# Patient Record
Sex: MALE | Race: WHITE | Employment: OTHER | URBAN - METROPOLITAN AREA
[De-identification: names, ages, dates, MRNs, and addresses within clinical notes are randomized per-mention and may not be internally consistent; named-entity substitution may affect disease eponyms.]

---

## 2017-01-02 ENCOUNTER — OFFICE VISIT (OUTPATIENT)
Dept: FAMILY MEDICINE CLINIC | Facility: CLINIC | Age: 82
End: 2017-01-02

## 2017-01-02 DIAGNOSIS — J02.9 PHARYNGITIS, UNSPECIFIED ETIOLOGY: ICD-10-CM

## 2017-01-02 DIAGNOSIS — J02.9 SORE THROAT: Primary | ICD-10-CM

## 2017-01-02 LAB
CONTROL LINE PRESENT WITH A CLEAR BACKGROUND (YES/NO): YES YES/NO
STREP GRP A CUL-SCR: NEGATIVE

## 2017-01-02 PROCEDURE — 87880 STREP A ASSAY W/OPTIC: CPT | Performed by: NURSE PRACTITIONER

## 2017-01-02 PROCEDURE — 87081 CULTURE SCREEN ONLY: CPT | Performed by: NURSE PRACTITIONER

## 2017-01-02 PROCEDURE — 99203 OFFICE O/P NEW LOW 30 MIN: CPT | Performed by: NURSE PRACTITIONER

## 2017-01-03 VITALS
SYSTOLIC BLOOD PRESSURE: 118 MMHG | HEART RATE: 78 BPM | TEMPERATURE: 98 F | DIASTOLIC BLOOD PRESSURE: 68 MMHG | OXYGEN SATURATION: 99 % | RESPIRATION RATE: 18 BRPM

## 2017-01-03 RX ORDER — METOPROLOL TARTRATE 50 MG/1
TABLET, FILM COATED ORAL
Refills: 3 | COMMUNITY
Start: 2016-11-21

## 2017-01-03 RX ORDER — PRAVASTATIN SODIUM 40 MG
TABLET ORAL
Refills: 3 | COMMUNITY
Start: 2016-11-22

## 2017-01-03 RX ORDER — ENALAPRIL MALEATE 2.5 MG/1
TABLET ORAL
Refills: 3 | COMMUNITY
Start: 2016-11-22

## 2017-01-03 RX ORDER — WARFARIN SODIUM 4 MG/1
TABLET ORAL
Refills: 3 | COMMUNITY
Start: 2016-10-27

## 2017-01-04 NOTE — PROGRESS NOTES
.. CHIEF COMPLAINT:   Patient presents with:  Sore Throat: was exposed to strep, wife has \"valve illness\" and family is concerned that he has strep and will transmit to wife.          HPI:   Ruby Nelson is a 80year old male presents to clinic wi GI: good BS's,no masses, hepatosplenomegaly, or tenderness on direct palpation  EXTREMITIES: no cyanosis, clubbing or edema  LYMPH: +  anterior cervical lymphadenopathy. No posterior cervical or occipital lymphadenopathy.     NEGATIVE RAPID STREP      ASSE A test has been done to find out whether you (or your child, if your child is the patient) have strep throat. Call this facility or your healthcare provider if you were not given your test results.  If the test is positive for strep infection, you will need · Use throat lozenges or numbing throat sprays to help reduce pain. Gargling with warm salt water will also help reduce throat pain. For this, dissolve 1/2 teaspoon of salt in 1 glass of warm water.  To help soothe a sore throat, children can sip on juice o

## 2017-01-04 NOTE — PATIENT INSTRUCTIONS
Pharyngitis (Sore Throat), Report Pending    Pharyngitis (sore throat) is often due to a virus. It can also be caused by the streptococcus, or strep, bacterium, often called strep throat.  Both viral and strep infections can cause throat pain that is wors · For children: Use acetaminophen for fever, fussiness, or discomfort.  In infants older than 10months of age, you may use ibuprofen instead of acetaminophen. Talk with your child's healthcare provider before giving these medicines if your child has chronic · Signs of dehydration (very dark urine or no urine, sunken eyes, dizziness)  · Trouble breathing or noisy breathing  · Muffled voice  · New rash  · Child appears to be getting sicker  Date Last Reviewed: 4/13/2015  © 9936-4154 [employer]. 7

## (undated) NOTE — MR AVS SNAPSHOT
[facility]  [address] [address]  [phone]               Thank you for choosing us for your health care visit with Isadora Quigley NP.   We are glad to serve you and happy to provide you with this summary of your visi · If the test is positive for strep, don't go to work or school for the first 2 days of taking the antibiotics. After this time, you will not be contagious.  You can then return to work or school if you are feeling better.   · Take the antibiotic medicine f ¨ Your child is of any age and has repeated fevers above 104°F (40°C). ¨ Your child is younger than 3years of age and has a fever of 100.4°F (38°C) that continues for more than 1 day.   ¨ Your child is 3years old or older and has a fever of 100.4°F (38°C Pharyngitis, unspecified etiology [[id number]]                                                  Results of Recent Testing     STREP A ASSAY W/OPTIC      Component Value Standard Range & Units    STREP GRP A CUL-SCR Negative Negative    Control Line Pr Water is best for hydration Fast food. Eat at home when possible     Tips for increasing your physical activity – Adults who are physically active are less likely to develop some chronic diseases than adults who are inactive.      HOW TO GET STARTED: HOW